# Patient Record
Sex: FEMALE | Race: BLACK OR AFRICAN AMERICAN
[De-identification: names, ages, dates, MRNs, and addresses within clinical notes are randomized per-mention and may not be internally consistent; named-entity substitution may affect disease eponyms.]

---

## 2018-03-29 ENCOUNTER — HOSPITAL ENCOUNTER (OUTPATIENT)
Dept: HOSPITAL 74 - JASU-SURG | Age: 68
Discharge: HOME | End: 2018-03-29
Attending: PODIATRIST
Payer: COMMERCIAL

## 2018-03-29 VITALS — TEMPERATURE: 97.8 F

## 2018-03-29 VITALS — HEART RATE: 67 BPM | DIASTOLIC BLOOD PRESSURE: 79 MMHG | SYSTOLIC BLOOD PRESSURE: 131 MMHG

## 2018-03-29 VITALS — BODY MASS INDEX: 30.9 KG/M2

## 2018-03-29 DIAGNOSIS — M20.41: ICD-10-CM

## 2018-03-29 DIAGNOSIS — M20.11: Primary | ICD-10-CM

## 2018-03-29 PROCEDURE — 0SRP0JZ REPLACEMENT OF RIGHT TOE PHALANGEAL JOINT WITH SYNTHETIC SUBSTITUTE, OPEN APPROACH: ICD-10-PCS | Performed by: PODIATRIST

## 2018-03-29 NOTE — OP
DATE OF OPERATION:

 

DATE OF DICTATION:  03/29/2018

 

PREOPERATIVE DIAGNOSIS:  Right foot bunion and painful hammertoe 2nd, 3rd, 4th 
and

5th digits of the right foot.  

 

POSTOPERATIVE DIAGNOSIS:  Right foot bunion and painful hammertoe 2nd, 3rd, 4th 
and

5th digits of the right foot.  

 

OPERATION: Juwan bunionectomy and 2nd, 3rd, 4th and 5th digit arthroplasty,

postoperative injections.  

 

ANESTHESIA:  Local with IV sedation.  

 

SURGEON:  Chris Tatum DPM

 

ASSISTANT:  Wily Castrejon DPM

 

SECOND ASSISTANT:  [id number]

 

HEMOSTASIS:  Pneumatic ankle tourniquet for 80 minutes.

 

ESTIMATED BLOOD LOSS:  Less than 5 mL. 

 

MATERIALS:  2-0 Vicryl, 3-0 Vicryl and 4-0 Vicryl and 4-0 nylon and 2.7 x 15 mm

partially threaded, pentagon-headed screw.  

 

SPECIMEN:  Pathology is bone and soft tissue right foot.  

 

OPERATIVE PROCEDURE:  Patient was brought to the operating room and placed on 
the

operating table in the supine position and pneumatic ankle tourniquet placed 
onto the

patient's ankle, and following IV sedation, local anesthesia was obtained 
utilizing

25 mL of 1:1 mixture of the 1% lidocaine plain and 0.5 Marcaine plain and the 
foot

was then scrubbed, prepped and draped in the usual aseptic manner.  An Esmarch

bandage was then utilized to exsanguinate the patient's right foot and the 
tourniquet

was inflated.  Attention was then directed to the dorsal aspect of the first

metatarsal head of the right foot, where approximately a 4 cm linear 
longitudinal

incision was made medial and parallel to the tendon of the extensor hallucis 
longus. 

The incision was deepened through the subcutaneous tissues to the capsular level

using sharp and blunt dissection.  Care was taken to identify and retract all 
the

vital neural and vascular structures.  All bleeders were ligated and cauterized 
and

essentially at this time a T-type capsulotomy was performed over the dorsal 
aspect of

the first metatarsophalangeal joint and the periosteal and capsular structures 
were

then carefully dissected free and reflected medially and laterally just 
exposing the

head of the first metatarsal.  Utilizing the sagittal saw, the dorsal and medial

prominence was resected and passed from the operating field.  The hip was then

externally rotated and the knee flexed so that the medial of the foot faced 
superior

for better visualization for Juwan procedure.  Through-and-through V-type 
osteotomy

was then created utilizing the sagittal bone saw.  The apex parted distally and 
with

the dorsal arm slightly longer than the plantar arm to accommodate the 
position.  The

capital fragment was then shifted laterally into improved position and was then

impacted on the head of the metatarsal shaft.  A 0.045 inch K-wire was then 
driven

across the osteotomy to provide the temporary fixation and the 2.7 x 15 mm 
partial

threaded headed screw was inserted across the osteotomy site provided excellent

compression and fixation.  The remaining medial bone shelves, as well as all the

rough edges of the bone were then resected and smoothed using the power 
instrument,

and the bunion deformity was noted to be vastly improved, and the wound was then

flushed with copious amounts of sterile saline.  The periosteal and capsular

structures were reapproximated using the 2-0 Vicryl, and redundant capsular 
tissue

was resected as needed and the subcutaneous tissue was then closed with 3-0 
Vicryl

and 4-0 Vicryl.  Skin edges were coapted using Dermabond and Sterile-Strip.  

 

Attention was directed to the 2nd digit where approximately a 3 cm curvilinear

longitudinal incision was made over the dorsal aspect of the proximal 
interphalangeal

joint of the 2nd digit and the incision was then deepened through the 
subcutaneous

tissue with care to retract all the neurovascular structures.  All bleeders were

cauterized and ligated and transverse tenotomy and capsulotomy was performed to 
the

proximal interphalangeal joint of the 3rd digit.  The head of the proximal 
phalanx

was then freed of its soft tissue attachment.  The sagittal saw was then used 
to cut

the head of the proximal phalanx and then passed from the operating table and 
the

same procedure for the 2nd digit was done for the 3rd and the 4th digits.  Then

attention was then directed to the 5th digit, where approximately a 2 cm 
curvilinear

oblique incision was made from the proximal lateral to the distal medial over 
the

dorsal aspect of the proximal interphalangeal joint of the 5th digit and the 
incision

was then deepened through the subcutaneous tissues with care to retract all the

neurovascular structures.  All bleeders were cauterized and ligated.  Transverse

tenotomy and capsulotomy was performed to the proximal interphalangeal joint of 
the

5th digit.  The head of the proximal phalanx was then freed of its soft tissues

fragments and the sagittal saw was then used to cut the head of the proximal 
phalanx

and was passed from the operating room table. The surgical site was then 
flushed with

copious amounts of sterile saline and the extensor tendons of the digits 2nd, 
3rd,

4th and 5th and the subcutaneous tissue was then reapproximated with 3-0 
Vicryl.  The

skin was reapproximated with 4-0 nylon sutures.  Upon completion of the 
procedure, a

total of 8 mL mixture of 4:1 of 0.5% Marcaine and 4 mg of dexamethasone were

infiltrated around the surgical.  The incision was dressed with Betadine soaked

Adaptic and covered with sterile compressive dressing, such as 4x4 and Tutu.  
The

tourniquet was then deflated and immediate hyperemia returned to all digits.  
The

foot was then Ace wrapped.  The patient tolerated the procedure well and was

transferred to the recovery room with all vital signs stable and vascular status

intact to the foot.  Following postoperative monitoring, the patient will be

discharged and given instructions and prescriptions, which were discussed prior 
to

the surgery.  

 

 

OLIVERIO Hu/[id number]

DD: 03/29/2018 14:58

DT: 03/29/2018 15:42

Job #:  [id number]

[id number]

## 2018-04-04 NOTE — PATH
Surgical Pathology Report



Patient Name:  MARIUM LAFLEUR

Accession #:  [id number]

[facility]. Rec. #:  [id number]                                                        

   /Age/Gender:  1950 (Age: 67) / F

Account:  [id number]                                                          

             [place] [facility]

Taken:  3/29/2018

Received:  3/30/2018

Reported:  2018

Physicians:  Chris Tatum DPM

  



Specimen(s) Received

A: BUNION 1ST TOE RIGHT FOOT 

B: SKIN AND BONE FROM 2ND TOE RIGHT FOOT 

C: SKIN AND BONE FROM 3RD TOE RIGHT FOOT 

D: SKIN AND BONE FROM 4TH TOE RIGHT FOOT 

E: SKIN AND BONE FROM 5TH TOE RIGHT FOOT 





Clinical History

Bunion second/third/fourth/fifth hammer toes, right foot







Final Diagnosis

A. BONE AND SOFT TISSUE, RIGHT FIRST TOE, EXCISION:

BONE WITH REACTIVE CHANGES, WITH ATTACHED ARTICULAR CARTILAGE AND SYNOVIUM.



B. SKIN AND BONE, RIGHT SECOND TOE, EXCISION:

UNREMARKABLE BONE WITH ATTACHED ARTICULAR CARTILAGE, AND SKIN WITH

HYPERKERATOSIS AND PARAKERATOSIS.



C. SKIN AND BONE, RIGHT THIRD TOE, EXCISION:

UNREMARKABLE BONE WITH ATTACHED ARTICULAR CARTILAGE, AND SKIN WITH

HYPERKERATOSIS AND PARAKERATOSIS.



D. SKIN AND BONE, RIGHT FOURTH TOE, EXCISION:

UNREMARKABLE BONE WITH ATTACHED ARTICULAR CARTILAGE, AND SKIN WITH

HYPERKERATOSIS AND PARAKERATOSIS.



E. SKIN AND BONE, RIGHT FIFTH TOE, EXCISION:

UNREMARKABLE BONE WITH ATTACHED ARTICULAR CARTILAGE, AND SKIN WITH

HYPERKERATOSIS AND PARAKERATOSIS.







***Electronically Signed***

Darrell Wagner M.D.





Gross Description

A.  Received in formalin labeled "bunion right first toe" are multiple fragments

of white-tan fibroconnective tissue and bone measuring 2 x 2 x 0.4 cm in

aggregate. Representative sections are submitted in one cassette after brief

decalcification.



B.  Received in formalin labeled "skin and bone second toe" is a single ellipse

of grossly unremarkable brown-tan skin measuring 1.5 x 0.4 x 0.3 cm. Also

identified is a white-tan portion of bone measuring 1 x 0.5 x 0.5 cm. The entire

specimen submitted in 1 cassette after brief decalcification.



C.  Received in formalin labeled "skin and bone third toe" is a single ellipse

of grossly unremarkable brown-tan skin measuring 1.0 x 0.5 x 0.3 cm. Also

identified is a white-tan portion of bone measuring 1 x 0.5 x 0.5 cm. The entire

specimen submitted in 1 cassette after brief decalcification.



D.  Received in formalin labeled "skin and bone fourth toe" is a single ellipse

of grossly unremarkable brown-tan skin measuring 1.0 x 0.5 x 0.3 cm. Also

identified is a white-tan portion of bone measuring 1 x 0.5 x 0.5 cm. The entire

specimen submitted in 1 cassette after brief decalcification.



E.  Received in formalin labeled "skin and bone fifth toe" is a single ellipse

of grossly unremarkable brown-tan skin measuring 1.5 x 0.5 x 0.3 cm. Also

identified is a white-tan portion of bone measuring 1 x 0.5 x 0.5 cm. The entire

specimen submitted in 1 cassette after brief decalcification.

AUDI/3/30/2018



vani/3/30/2018